# Patient Record
(demographics unavailable — no encounter records)

---

## 2025-05-27 NOTE — PHYSICAL EXAM
[Bilateral] : foot and ankle bilaterally [NL (40)] : plantar flexion 40 degrees [NL 30)] : inversion 30 degrees [NL (20)] : eversion 20 degrees [5___] : eversion 5[unfilled]/5 [2+] : dorsalis pedis pulse: 2+ [] : patient ambulates without assistive device

## 2025-05-27 NOTE — HISTORY OF PRESENT ILLNESS
[Dull/Aching] : dull/aching [Sharp] : sharp [de-identified] : 05/27/2025:1 year ankle pain. pain assoc w/ volleyball. having recurrent inversion injuries. no prior sig ankle probs. denies pmh. [employer]th grade. [employer] [] : Post Surgical Visit: no [FreeTextEntry1] : BL ankle